# Patient Record
(demographics unavailable — no encounter records)

---

## 2024-12-19 NOTE — HISTORY OF PRESENT ILLNESS
[Never] : never [TextBox_4] : Ms. Koko Hawkins returned to clinic today in follow-up for asthma. In review, Ms. Hawkins is a 56 yo F h/o asthma, COVID (mild/moderate, ), and HTN. Ms. Hawkins is cared for by Dr. Cony Smith.  2023: Ms. Hawkins reports feeling well. She denies any history of childhood lung disease. She developed asthma in her 40s and was previously seen by a pulmonologist at [facility]. Her prior treatment included Albuterol and Montelukast, both of which  several years ago. For several years, Ms. Hawkins has not had any respiratory symptoms, and she stopped using medications. Over the last several months, Ms. Hawkins has had worsening cough and wheezing in the setting of viral illnesses. She has not required hospitalization nor ER visits. She estimates 3-4 antibiotic and/or Prednisone courses in the last year. Exacerbating factors include seasonal allergies (william. pollen). Alleviating factors include Albuterol and Prednisone. She is able to walk long distances on flat surfaces. She is not having night-time awakenings. She endorses sinus drainage that improves with Loratadine. She has occasional epigastric burning that is improved with Omeprazole. Ms. Hawkins does not have pets at home. We pursued re-initiation of her prior regimen (Montelukast, PRN Albuterol).  2024: Ms. Hawkins feels "ok" today. She has had persistent variable episodes of chest tightness and wheezing that occur abruptly and last for 20-30 minutes. The episodes are precipitated by cold weather. She received 1 course of Prednisone for an asthma exacerbation in the last 4 months. She has not had ER visits nor hospitalizations. She is taking the Montelukast and PRN Albuterol. She has had benefit from Theraflu in the past. Over the last several years, she reports weight gain. She is unsure if she snores, but she endorses daytime fatigue with daily napping. She does not fall asleep uncontrollably.  2024: Since our last visit, Ms. Hawkins has not had asthma exacerbations. She has taken the Symbicort during respiratory infections (last in /July) with improved breathing. She has been using the CPAP for ~2 months, and she has not noticed significant improvement.  24 Overall feels well. She has no symptoms and denies any recent asthma exacerbations. She continues to use her inhalers without issue. Denies any recent fevers, chills, infections. She hasn't been very active recently but states she has no limitations when she does do any activity. She continues to use her CPAP.   PMH: Asthma COVID in  (mild) HTN Moderate CONRAD (on CPAP)  PSH: Lipoma removal Bilateral tubal ligation Hysterectomy for bleeding Abdominoplasty  FH: Mom has a pacemaker Dad with prostate cancer  SH: Ms. Hawkins is from [facility]; she lives in the [place]. 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  Ms. Hawkins is a never-smoker. She denies vaping. She drinks alcohol rarely. She Has used marijuana in the remote past.   Ms. Hawkins denies exposure to Asbestos, Tuberculosis, mold, dust, smoke, fumes, heavy metals, Beryllium, birds, feathers, or hot tubs.

## 2024-12-19 NOTE — ASSESSMENT
[FreeTextEntry1] : Labs: WBC 9.92 (), Hgb 15.1, HCT 47.2, Plts 251 RAST: negative IgE: 63  CXR (12/2023): Findings/ impression: Heart, lungs, mediastinum and thorax are unremarkable  PFTs:             4/2024 FEV1/FVC     85% FEV1             2.64, 99% FVC               3.10, 93% TLC                4.27, 87% RV                  1.17, 64% DLCO             18.14, 77% -4/2024: No significant bronchodilator response  6MWT 4/2024: 518 meters (1699 feet); laquita SpO2 99% on room air  Methacholine challenge test (9/4/24): Normal  HST (5/8/24): AHI 21.5 (CMS criteria) Time <89% SpO2: 6.8 minutes  Asthma control test 4/2024: 20 9/2024: 24 12/2024: 25  A/P: 54 yo F h/o adult-onset asthma (exacerbated by respiratory infections), COVID, and CONRAD returns to clinic.  Ms. Hawkins has not had respiratory exacerbations, and her symptom burden is low. We reviewed her methacholine challenge test today, which is negative. She is currently only using LABA/ICS during upper respiratory tract infections, which is working well.  For her CONRAD, she is wearing CPAP consistently. She sometimes finds it uncomfortable.  For her erythrocytosis, I suspect CONRAD and night-time hypoxemia. We will obtain her CPAP Compliance report. I would like to obtain a CBC   1. History of asthma: well-controlled 2. Elevated hematocrit 3. Moderate CONRAD  -continue PRN Symbicort 80/4.5 mcg, 2 puffs; max 12 puffs/day; continue Montelukast and PRN Albuterol  -continue Auto-PAP (nasal mask); request Adherence report -Vaccinations: I recommended the Influenza vaccination, she declined. -repeat CBC today; if persistently elevated, consider overnight oximetry vs Hematology evaluation -follow-up with me in 6 months

## 2024-12-19 NOTE — REVIEW OF SYSTEMS
[Negative] : Endocrine [TextBox_30] : Wheezing and cough with respiratory illness [TextBox_14] : Sinus drainage [TextBox_69] : Epigastric burning

## 2024-12-19 NOTE — HISTORY OF PRESENT ILLNESS
[Never] : never [TextBox_4] : Ms. Koko Hawkins returned to clinic today in follow-up for asthma. In review, Ms. Hawkins is a 56 yo F h/o asthma, COVID (mild/moderate, ), and HTN. Ms. Hawkins is cared for by Dr. Cony Smith.  2023: Ms. Hawkins reports feeling well. She denies any history of childhood lung disease. She developed asthma in her 40s and was previously seen by a pulmonologist at [facility]. Her prior treatment included Albuterol and Montelukast, both of which  several years ago. For several years, Ms. Hawkins has not had any respiratory symptoms, and she stopped using medications. Over the last several months, Ms. Hawkins has had worsening cough and wheezing in the setting of viral illnesses. She has not required hospitalization nor ER visits. She estimates 3-4 antibiotic and/or Prednisone courses in the last year. Exacerbating factors include seasonal allergies (william. pollen). Alleviating factors include Albuterol and Prednisone. She is able to walk long distances on flat surfaces. She is not having night-time awakenings. She endorses sinus drainage that improves with Loratadine. She has occasional epigastric burning that is improved with Omeprazole. Ms. Hawkins does not have pets at home. We pursued re-initiation of her prior regimen (Montelukast, PRN Albuterol).  2024: Ms. Hawkins feels "ok" today. She has had persistent variable episodes of chest tightness and wheezing that occur abruptly and last for 20-30 minutes. The episodes are precipitated by cold weather. She received 1 course of Prednisone for an asthma exacerbation in the last 4 months. She has not had ER visits nor hospitalizations. She is taking the Montelukast and PRN Albuterol. She has had benefit from Theraflu in the past. Over the last several years, she reports weight gain. She is unsure if she snores, but she endorses daytime fatigue with daily napping. She does not fall asleep uncontrollably.  2024: Since our last visit, Ms. Hawkins has not had asthma exacerbations. She has taken the Symbicort during respiratory infections (last in /July) with improved breathing. She has been using the CPAP for ~2 months, and she has not noticed significant improvement.  24 Overall feels well. She has no symptoms and denies any recent asthma exacerbations. She continues to use her inhalers without issue. Denies any recent fevers, chills, infections. She hasn't been very active recently but states she has no limitations when she does do any activity. She continues to use her CPAP.   PMH: Asthma COVID in  (mild) HTN Moderate CONRAD (on CPAP)  PSH: Lipoma removal Bilateral tubal ligation Hysterectomy for bleeding Abdominoplasty  FH: Mom has a pacemaker Dad with prostate cancer  SH: Ms. Hawkins is from [facility]; she lives in the [place]. 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  Ms. Hawkins is a never-smoker. She denies vaping. She drinks alcohol rarely. She Has used marijuana in the remote past.   Ms. Hawkins denies exposure to Asbestos, Tuberculosis, mold, dust, smoke, fumes, heavy metals, Beryllium, birds, feathers, or hot tubs.

## 2024-12-19 NOTE — ADDENDUM
[FreeTextEntry1] : Addendum (Cady; 12/19/24): CPAP Compliance report: Use >/+ 4 hours: 60/90 days (67%) Median Pressure: 8.6 cmH2O Median Leak 2 L/min AHI 3.0 Central apnea index: 0.8

## 2024-12-19 NOTE — ASSESSMENT
[FreeTextEntry1] : Labs: WBC 9.92 (), Hgb 15.1, HCT 47.2, Plts 251 RAST: negative IgE: 63  CXR (12/2023): Findings/ impression: Heart, lungs, mediastinum and thorax are unremarkable  PFTs:             4/2024 FEV1/FVC     85% FEV1             2.64, 99% FVC               3.10, 93% TLC                4.27, 87% RV                  1.17, 64% DLCO             18.14, 77% -4/2024: No significant bronchodilator response  6MWT 4/2024: 518 meters (1699 feet); laquita SpO2 99% on room air  Methacholine challenge test (9/4/24): Normal  HST (5/8/24): AHI 21.5 (CMS criteria) Time <89% SpO2: 6.8 minutes  Asthma control test 4/2024: 20 9/2024: 24 12/2024: 25  A/P: 56 yo F h/o adult-onset asthma (exacerbated by respiratory infections), COVID, and CONRAD returns to clinic.  Ms. Hawkins has not had respiratory exacerbations, and her symptom burden is low. We reviewed her methacholine challenge test today, which is negative. She is currently only using LABA/ICS during upper respiratory tract infections, which is working well.  For her CONRAD, she is wearing CPAP consistently. She sometimes finds it uncomfortable.  For her erythrocytosis, I suspect CONRAD and night-time hypoxemia. We will obtain her CPAP Compliance report. I would like to obtain a CBC   1. History of asthma: well-controlled 2. Elevated hematocrit 3. Moderate CONRAD  -continue PRN Symbicort 80/4.5 mcg, 2 puffs; max 12 puffs/day; continue Montelukast and PRN Albuterol  -continue Auto-PAP (nasal mask); request Adherence report -Vaccinations: I recommended the Influenza vaccination, she declined. -repeat CBC today; if persistently elevated, consider overnight oximetry vs Hematology evaluation -follow-up with me in 6 months